# Patient Record
Sex: MALE | Race: WHITE | Employment: OTHER | URBAN - METROPOLITAN AREA
[De-identification: names, ages, dates, MRNs, and addresses within clinical notes are randomized per-mention and may not be internally consistent; named-entity substitution may affect disease eponyms.]

---

## 2024-09-24 ENCOUNTER — HOSPITAL ENCOUNTER (EMERGENCY)
Facility: HOSPITAL | Age: 34
Discharge: HOME | End: 2024-09-25
Attending: EMERGENCY MEDICINE

## 2024-09-24 DIAGNOSIS — J06.9 UPPER RESPIRATORY TRACT INFECTION, UNSPECIFIED TYPE: Primary | ICD-10-CM

## 2024-09-24 PROCEDURE — 99283 EMERGENCY DEPT VISIT LOW MDM: CPT

## 2024-09-25 VITALS
HEIGHT: 63.78 IN | OXYGEN SATURATION: 98 % | HEART RATE: 75 BPM | SYSTOLIC BLOOD PRESSURE: 129 MMHG | TEMPERATURE: 99 F | DIASTOLIC BLOOD PRESSURE: 71 MMHG | RESPIRATION RATE: 22 BRPM | WEIGHT: 176 LBS | BODY MASS INDEX: 30.42 KG/M2

## 2024-09-25 LAB
FLUAV + FLUBV RNA SPEC NAA+PROBE: NEGATIVE
FLUAV + FLUBV RNA SPEC NAA+PROBE: NEGATIVE
RSV RNA SPEC NAA+PROBE: NEGATIVE
SARS-COV-2 RNA RESP QL NAA+PROBE: NOT DETECTED

## 2024-09-25 PROCEDURE — 0241U SARS-COV-2/FLU A AND B/RSV BY PCR (GENEXPERT): CPT | Performed by: EMERGENCY MEDICINE

## 2024-09-25 RX ORDER — CODEINE PHOSPHATE AND GUAIFENESIN 10; 100 MG/5ML; MG/5ML
5 SOLUTION ORAL EVERY EVENING
Qty: 25 ML | Refills: 0 | Status: SHIPPED | OUTPATIENT
Start: 2024-09-25 | End: 2024-09-30

## 2024-09-25 RX ORDER — BENZONATATE 100 MG/1
100 CAPSULE ORAL 3 TIMES DAILY PRN
Qty: 30 CAPSULE | Refills: 0 | Status: SHIPPED | OUTPATIENT
Start: 2024-09-25 | End: 2024-10-25

## 2024-09-25 NOTE — ED INITIAL ASSESSMENT (HPI)
Pt arrives through triage with complaints of cough since last week. Reports cough more severe today. Unsure of fevers. +sore throat

## 2024-09-25 NOTE — ED PROVIDER NOTES
Patient Seen in: [facility] Emergency Department      History     Chief Complaint   Patient presents with    Cough/URI    Sore Throat     Stated Complaint: cough    Subjective:   HPI    Patient is a 33-year-old Ecuad[place]-speaking male who presents with cough x 1 week.  Taking cough syrup at home with no relief.  No shortness of breath.  No vomiting.  No fevers.    Objective:   History reviewed. No pertinent past medical history.           History reviewed. No pertinent surgical history.             Social History     Socioeconomic History    Marital status:    Tobacco Use    Smoking status: Never    Smokeless tobacco: Never   Vaping Use    Vaping status: Never Used   Substance and Sexual Activity    Alcohol use: Yes     Comment: occ    Drug use: Never              Review of Systems    Positive for stated Chief Complaint: Cough/URI and Sore Throat    Other systems are as noted in HPI.  Constitutional and vital signs reviewed.      All other systems reviewed and negative except as noted above.    Physical Exam     ED Triage Vitals [09/24/24 2306]   /76   Pulse 71   Resp 18   Temp 99 °F (37.2 °C)   Temp src Temporal   SpO2 97 %   O2 Device None (Room air)       Current Vitals:   Vital Signs  BP: 128/76  Pulse: 71  Resp: 18  Temp: 99 °F (37.2 °C)  Temp src: Temporal  MAP (mmHg): 93    Oxygen Therapy  SpO2: 97 %  O2 Device: None (Room air)            Physical Exam  GENERAL: No acute distress, awake and alert  HEENT: EOMI, PERRL  Neck: supple  CV: RRR, no murmurs  Resp: CTAB, no wheezes or retractions  Extremities: FROM of all extremities  Neuro: CN intact, normal speech, normal gait, 5/5 motor strength in all extremities, no focal deficits  SKIN: warm, dry, no rashes      ED Course     Labs Reviewed   SARS-COV-2/FLU A AND B/RSV BY PCR (GENEXPERT)          [facility]           Medical Decision Making  Covid swab sent  Lungs clear/no respiratory distress  Suspect URI-advised on home care and return  precautions    Amount and/or Complexity of Data Reviewed  Labs: ordered.    Risk  Prescription drug management.        Disposition and Plan     Clinical Impression:  1. Upper respiratory tract infection, unspecified type         Disposition:  Discharge  9/25/2024 12:12 am    Follow-up:  [facility]  [address] [place] [id number] [phone]    Follow up            Medications Prescribed:  Current Discharge Medication List        START taking these medications    Details   benzonatate 100 MG Oral Cap Take 1 capsule (100 mg total) by mouth 3 (three) times daily as needed for cough.  Qty: 30 capsule, Refills: 0      guaiFENesin-codeine 100-10 MG/5ML Oral Solution Take 5 mL by mouth every evening for 5 days.  Qty: 25 mL, Refills: 0    Associated Diagnoses: Upper respiratory tract infection, unspecified type